# Patient Record
Sex: FEMALE | Race: OTHER | NOT HISPANIC OR LATINO
[De-identification: names, ages, dates, MRNs, and addresses within clinical notes are randomized per-mention and may not be internally consistent; named-entity substitution may affect disease eponyms.]

---

## 2017-03-07 ENCOUNTER — TRANSCRIPTION ENCOUNTER (OUTPATIENT)
Age: 52
End: 2017-03-07

## 2017-03-07 ENCOUNTER — INPATIENT (INPATIENT)
Facility: HOSPITAL | Age: 52
LOS: 0 days | Discharge: HOME | End: 2017-03-08
Attending: INTERNAL MEDICINE | Admitting: INTERNAL MEDICINE
Payer: COMMERCIAL

## 2017-03-07 VITALS
OXYGEN SATURATION: 100 % | HEART RATE: 59 BPM | TEMPERATURE: 98 F | RESPIRATION RATE: 18 BRPM | DIASTOLIC BLOOD PRESSURE: 101 MMHG | SYSTOLIC BLOOD PRESSURE: 160 MMHG

## 2017-03-07 DIAGNOSIS — I25.10 ATHEROSCLEROTIC HEART DISEASE OF NATIVE CORONARY ARTERY WITHOUT ANGINA PECTORIS: Chronic | ICD-10-CM

## 2017-03-07 DIAGNOSIS — Z95.5 PRESENCE OF CORONARY ANGIOPLASTY IMPLANT AND GRAFT: Chronic | ICD-10-CM

## 2017-03-07 DIAGNOSIS — R07.89 OTHER CHEST PAIN: ICD-10-CM

## 2017-03-07 DIAGNOSIS — Z41.8 ENCOUNTER FOR OTHER PROCEDURES FOR PURPOSES OTHER THAN REMEDYING HEALTH STATE: ICD-10-CM

## 2017-03-07 DIAGNOSIS — I10 ESSENTIAL (PRIMARY) HYPERTENSION: ICD-10-CM

## 2017-03-07 DIAGNOSIS — E78.5 HYPERLIPIDEMIA, UNSPECIFIED: ICD-10-CM

## 2017-03-07 DIAGNOSIS — I25.10 ATHEROSCLEROTIC HEART DISEASE OF NATIVE CORONARY ARTERY WITHOUT ANGINA PECTORIS: ICD-10-CM

## 2017-03-07 LAB
ALBUMIN SERPL ELPH-MCNC: 4.1 G/DL — SIGNIFICANT CHANGE UP (ref 3.3–5)
ALP SERPL-CCNC: 49 U/L — SIGNIFICANT CHANGE UP (ref 40–120)
ALT FLD-CCNC: 14 U/L — SIGNIFICANT CHANGE UP (ref 4–33)
APPEARANCE UR: CLEAR — SIGNIFICANT CHANGE UP
AST SERPL-CCNC: 18 U/L — SIGNIFICANT CHANGE UP (ref 4–32)
BACTERIA # UR AUTO: SIGNIFICANT CHANGE UP
BASOPHILS # BLD AUTO: 0.05 K/UL — SIGNIFICANT CHANGE UP (ref 0–0.2)
BASOPHILS NFR BLD AUTO: 0.5 % — SIGNIFICANT CHANGE UP (ref 0–2)
BILIRUB SERPL-MCNC: 0.4 MG/DL — SIGNIFICANT CHANGE UP (ref 0.2–1.2)
BILIRUB UR-MCNC: NEGATIVE — SIGNIFICANT CHANGE UP
BLOOD UR QL VISUAL: NEGATIVE — SIGNIFICANT CHANGE UP
BUN SERPL-MCNC: 21 MG/DL — SIGNIFICANT CHANGE UP (ref 7–23)
CALCIUM SERPL-MCNC: 9.6 MG/DL — SIGNIFICANT CHANGE UP (ref 8.4–10.5)
CHLORIDE SERPL-SCNC: 108 MMOL/L — HIGH (ref 98–107)
CHOLEST SERPL-MCNC: 168 MG/DL — SIGNIFICANT CHANGE UP (ref 120–199)
CK MB BLD-MCNC: 2.23 NG/ML — SIGNIFICANT CHANGE UP (ref 1–4.7)
CK MB BLD-MCNC: 2.32 NG/ML — SIGNIFICANT CHANGE UP (ref 1–4.7)
CK MB BLD-MCNC: SIGNIFICANT CHANGE UP (ref 0–2.5)
CK MB BLD-MCNC: SIGNIFICANT CHANGE UP (ref 0–2.5)
CK SERPL-CCNC: 110 U/L — SIGNIFICANT CHANGE UP (ref 25–170)
CK SERPL-CCNC: 136 U/L — SIGNIFICANT CHANGE UP (ref 25–170)
CO2 SERPL-SCNC: 24 MMOL/L — SIGNIFICANT CHANGE UP (ref 22–31)
COLOR SPEC: COLORLESS — SIGNIFICANT CHANGE UP
CREAT SERPL-MCNC: 0.87 MG/DL — SIGNIFICANT CHANGE UP (ref 0.5–1.3)
EOSINOPHIL # BLD AUTO: 0.11 K/UL — SIGNIFICANT CHANGE UP (ref 0–0.5)
EOSINOPHIL NFR BLD AUTO: 1.2 % — SIGNIFICANT CHANGE UP (ref 0–6)
GLUCOSE SERPL-MCNC: 147 MG/DL — HIGH (ref 70–99)
GLUCOSE UR-MCNC: NEGATIVE — SIGNIFICANT CHANGE UP
HBA1C BLD-MCNC: 5.5 % — SIGNIFICANT CHANGE UP (ref 4–5.6)
HCG UR-SCNC: NEGATIVE — SIGNIFICANT CHANGE UP
HCT VFR BLD CALC: 36.6 % — SIGNIFICANT CHANGE UP (ref 34.5–45)
HDLC SERPL-MCNC: 57 MG/DL — SIGNIFICANT CHANGE UP (ref 45–65)
HGB BLD-MCNC: 11.8 G/DL — SIGNIFICANT CHANGE UP (ref 11.5–15.5)
IMM GRANULOCYTES NFR BLD AUTO: 0.1 % — SIGNIFICANT CHANGE UP (ref 0–1.5)
KETONES UR-MCNC: NEGATIVE — SIGNIFICANT CHANGE UP
LEUKOCYTE ESTERASE UR-ACNC: NEGATIVE — SIGNIFICANT CHANGE UP
LIPID PNL WITH DIRECT LDL SERPL: 112 MG/DL — SIGNIFICANT CHANGE UP
LYMPHOCYTES # BLD AUTO: 5.51 K/UL — HIGH (ref 1–3.3)
LYMPHOCYTES # BLD AUTO: 59.6 % — HIGH (ref 13–44)
MCHC RBC-ENTMCNC: 30.3 PG — SIGNIFICANT CHANGE UP (ref 27–34)
MCHC RBC-ENTMCNC: 32.2 % — SIGNIFICANT CHANGE UP (ref 32–36)
MCV RBC AUTO: 93.8 FL — SIGNIFICANT CHANGE UP (ref 80–100)
MONOCYTES # BLD AUTO: 0.59 K/UL — SIGNIFICANT CHANGE UP (ref 0–0.9)
MONOCYTES NFR BLD AUTO: 6.4 % — SIGNIFICANT CHANGE UP (ref 2–14)
MUCOUS THREADS # UR AUTO: SIGNIFICANT CHANGE UP
NEUTROPHILS # BLD AUTO: 2.98 K/UL — SIGNIFICANT CHANGE UP (ref 1.8–7.4)
NEUTROPHILS NFR BLD AUTO: 32.2 % — LOW (ref 43–77)
NITRITE UR-MCNC: NEGATIVE — SIGNIFICANT CHANGE UP
PH UR: 7 — SIGNIFICANT CHANGE UP (ref 4.6–8)
PLATELET # BLD AUTO: 311 K/UL — SIGNIFICANT CHANGE UP (ref 150–400)
PMV BLD: 11.8 FL — SIGNIFICANT CHANGE UP (ref 7–13)
POTASSIUM SERPL-MCNC: 3.6 MMOL/L — SIGNIFICANT CHANGE UP (ref 3.5–5.3)
POTASSIUM SERPL-SCNC: 3.6 MMOL/L — SIGNIFICANT CHANGE UP (ref 3.5–5.3)
PROT SERPL-MCNC: 7.3 G/DL — SIGNIFICANT CHANGE UP (ref 6–8.3)
PROT UR-MCNC: NEGATIVE — SIGNIFICANT CHANGE UP
RBC # BLD: 3.9 M/UL — SIGNIFICANT CHANGE UP (ref 3.8–5.2)
RBC # FLD: 13.5 % — SIGNIFICANT CHANGE UP (ref 10.3–14.5)
RBC CASTS # UR COMP ASSIST: SIGNIFICANT CHANGE UP (ref 0–?)
SODIUM SERPL-SCNC: 144 MMOL/L — SIGNIFICANT CHANGE UP (ref 135–145)
SP GR SPEC: 1.02 — SIGNIFICANT CHANGE UP (ref 1–1.03)
SP GR UR: 1.02 — SIGNIFICANT CHANGE UP (ref 1–1.03)
SQUAMOUS # UR AUTO: SIGNIFICANT CHANGE UP
TRIGL SERPL-MCNC: 35 MG/DL — SIGNIFICANT CHANGE UP (ref 10–149)
TROPONIN T SERPL-MCNC: < 0.06 NG/ML — SIGNIFICANT CHANGE UP (ref 0–0.06)
TROPONIN T SERPL-MCNC: < 0.06 NG/ML — SIGNIFICANT CHANGE UP (ref 0–0.06)
TSH SERPL-MCNC: 1.54 UIU/ML — SIGNIFICANT CHANGE UP (ref 0.27–4.2)
UROBILINOGEN FLD QL: NORMAL E.U. — SIGNIFICANT CHANGE UP (ref 0.1–0.2)
WBC # BLD: 9.25 K/UL — SIGNIFICANT CHANGE UP (ref 3.8–10.5)
WBC # FLD AUTO: 9.25 K/UL — SIGNIFICANT CHANGE UP (ref 3.8–10.5)
WBC UR QL: SIGNIFICANT CHANGE UP (ref 0–?)

## 2017-03-07 PROCEDURE — 93458 L HRT ARTERY/VENTRICLE ANGIO: CPT | Mod: 26

## 2017-03-07 PROCEDURE — 71020: CPT | Mod: 26

## 2017-03-07 RX ORDER — ONDANSETRON 8 MG/1
4 TABLET, FILM COATED ORAL ONCE
Qty: 0 | Refills: 0 | Status: COMPLETED | OUTPATIENT
Start: 2017-03-07 | End: 2017-03-07

## 2017-03-07 RX ORDER — ASPIRIN/CALCIUM CARB/MAGNESIUM 324 MG
81 TABLET ORAL DAILY
Qty: 0 | Refills: 0 | Status: DISCONTINUED | OUTPATIENT
Start: 2017-03-08 | End: 2017-03-08

## 2017-03-07 RX ORDER — CLOPIDOGREL BISULFATE 75 MG/1
75 TABLET, FILM COATED ORAL DAILY
Qty: 0 | Refills: 0 | Status: DISCONTINUED | OUTPATIENT
Start: 2017-03-08 | End: 2017-03-08

## 2017-03-07 RX ORDER — ASPIRIN/CALCIUM CARB/MAGNESIUM 324 MG
324 TABLET ORAL ONCE
Qty: 0 | Refills: 0 | Status: COMPLETED | OUTPATIENT
Start: 2017-03-07 | End: 2017-03-07

## 2017-03-07 RX ORDER — INFLUENZA VIRUS VACCINE 15; 15; 15; 15 UG/.5ML; UG/.5ML; UG/.5ML; UG/.5ML
0.5 SUSPENSION INTRAMUSCULAR ONCE
Qty: 0 | Refills: 0 | Status: COMPLETED | OUTPATIENT
Start: 2017-03-07 | End: 2017-03-07

## 2017-03-07 RX ORDER — FENOFIBRATE,MICRONIZED 130 MG
145 CAPSULE ORAL DAILY
Qty: 0 | Refills: 0 | Status: DISCONTINUED | OUTPATIENT
Start: 2017-03-07 | End: 2017-03-08

## 2017-03-07 RX ORDER — CLOPIDOGREL BISULFATE 75 MG/1
300 TABLET, FILM COATED ORAL ONCE
Qty: 0 | Refills: 0 | Status: COMPLETED | OUTPATIENT
Start: 2017-03-07 | End: 2017-03-07

## 2017-03-07 RX ORDER — SODIUM CHLORIDE 9 MG/ML
1000 INJECTION INTRAMUSCULAR; INTRAVENOUS; SUBCUTANEOUS ONCE
Qty: 0 | Refills: 0 | Status: COMPLETED | OUTPATIENT
Start: 2017-03-07 | End: 2017-03-07

## 2017-03-07 RX ORDER — ATORVASTATIN CALCIUM 80 MG/1
40 TABLET, FILM COATED ORAL AT BEDTIME
Qty: 0 | Refills: 0 | Status: DISCONTINUED | OUTPATIENT
Start: 2017-03-07 | End: 2017-03-08

## 2017-03-07 RX ORDER — CARVEDILOL PHOSPHATE 80 MG/1
3.12 CAPSULE, EXTENDED RELEASE ORAL EVERY 12 HOURS
Qty: 0 | Refills: 0 | Status: DISCONTINUED | OUTPATIENT
Start: 2017-03-07 | End: 2017-03-08

## 2017-03-07 RX ADMIN — CARVEDILOL PHOSPHATE 3.12 MILLIGRAM(S): 80 CAPSULE, EXTENDED RELEASE ORAL at 20:22

## 2017-03-07 RX ADMIN — Medication 324 MILLIGRAM(S): at 02:18

## 2017-03-07 RX ADMIN — Medication 145 MILLIGRAM(S): at 12:39

## 2017-03-07 RX ADMIN — ONDANSETRON 4 MILLIGRAM(S): 8 TABLET, FILM COATED ORAL at 02:18

## 2017-03-07 RX ADMIN — ATORVASTATIN CALCIUM 40 MILLIGRAM(S): 80 TABLET, FILM COATED ORAL at 22:03

## 2017-03-07 RX ADMIN — CLOPIDOGREL BISULFATE 300 MILLIGRAM(S): 75 TABLET, FILM COATED ORAL at 09:50

## 2017-03-07 RX ADMIN — SODIUM CHLORIDE 1000 MILLILITER(S): 9 INJECTION INTRAMUSCULAR; INTRAVENOUS; SUBCUTANEOUS at 02:18

## 2017-03-07 NOTE — ED ADULT NURSE REASSESSMENT NOTE - NS ED NURSE REASSESS COMMENT FT1
return from break coverage, pt VS as noted, in NAD, denies chest pain at this time, states she feels lightheaded, MD Becker aware, awaiting lab results, will continue to monitor pt closely.

## 2017-03-07 NOTE — H&P ADULT. - ASSESSMENT
52 yo female with PMHx of MI 2013, Cardiac Arrest, cardiac stents x2, HTN, and HLD p/w new onset of fatigue, dizziness, palpitations and near syncope after carrying shopping bags  yesterday, followed by diaphoresis, dizziness, palpitations, and nausea which awoke her this morning, admitted to tele for atypical chest pain, r/o ACS.    +Atypical chest pain-->plavix load, Dasha, plan for cardiac cath

## 2017-03-07 NOTE — DISCHARGE NOTE ADULT - CARE PROVIDER_API CALL
Vega Sanchez), Cardiac Electrophysiology; Cardiovascular Disease; Internal Medicine  [id number] [place], [place] [address]  [place] [address]  Phone: [phone]  Fax: [phone]    Jose Alcantar (), Cardiology; Internal Medicine  [address] [place] [address] [address]  [place] [address]  Phone: [phone]  Fax: [phone]

## 2017-03-07 NOTE — ED PROVIDER NOTE - OBJECTIVE STATEMENT
att50 y/o f h/o CAD, cardiac arrest, MI stents, HLD, presents with dizziness/lightheadedness, palpitations, fatigue, weakness and vomiting. Progressively worse since yesterday am. Vomiting just in ED. No chest pain. No abd pain. Slight dry cough, no diarrhea. No other uri symptoms. Pt appears pale and fatigued to family.

## 2017-03-07 NOTE — H&P ADULT. - NEUROLOGICAL DETAILS
sensation intact/responds to pain/cranial nerves intact/responds to verbal commands/alert and oriented x 3

## 2017-03-07 NOTE — H&P ADULT. - RS GEN PE MLT RESP DETAILS PC
no rales/no chest wall tenderness/breath sounds equal/respirations non-labored/airway patent/clear to auscultation bilaterally/good air movement

## 2017-03-07 NOTE — DISCHARGE NOTE ADULT - CARE PROVIDERS DIRECT ADDRESSES
[email or web address],DirectAddress_Unknown

## 2017-03-07 NOTE — ED ADULT TRIAGE NOTE - CHIEF COMPLAINT QUOTE
pt was shopping yesterday at 11am and carrying heavy bags, began feeling dizziness, lethargy, and palpitations. also c.o nausea, actively vomiting in triage. appears pale. denies pain. pmh MI, stents x2, HLD

## 2017-03-07 NOTE — ED PROVIDER NOTE - MEDICAL DECISION MAKING DETAILS
att52 y/o f h/o CAD, cardiac arrest, MI stents, HLD, presents with dizziness/lightheadedness, palpitations, fatigue, weakness and vomiting. Progressively worse since yesterday am. Vomiting just in ED. No chest pain. No abd pain. Slight dry cough, no diarrhea. No other uri symptoms. Pt appears pale and fatigued to family. Exam as above. No TTP. Normal neuro exam. Plan for labs, IVF, r/u ACS, r/u infection, zofran, lipase, and reassessment. Plan to admit with significant history and appearance to r/u acs if negative workup. Not consistent with influenza, no HA, no fever. att52 y/o f h/o CAD, cardiac arrest, MI stents, HLD, presents with dizziness/lightheadedness, palpitations, fatigue, weakness and vomiting. Progressively worse since yesterday am. Vomiting just in ED. No chest pain. No abd pain. Slight dry cough, no diarrhea. No other uri symptoms. Pt appears pale and fatigued to family. Exam as above. No TTP. Normal neuro exam. Plan for labs, IVF, r/u ACS, r/u infection, zofran, lipase, and reassessment. Plan to admit with significant history and appearance to r/u acs if negative workup. Pt and family concerned with symptoms and similar CAD.  Not consistent with influenza, no HA, no fever. att52 y/o f h/o CAD, cardiac arrest, MI stents, HLD, presents with dizziness/lightheadedness, palpitations, fatigue, weakness and vomiting. Progressively worse since yesterday am. Vomiting just in ED. No chest pain. No abd pain. Slight dry cough, no diarrhea. No other uri symptoms. Pt appears pale and fatigued to family. Exam as above. No TTP. Normal neuro exam. Plan for labs, IVF, r/u ACS, r/u infection, zofran, lipase, and reassessment. changes on EKG. Plan to admit with significant history and appearance to r/u acs if negative infectious workup. Pt and family concerned with symptoms and previous CAD.  Not consistent with influenza, no HA, no fever. Some improvement in ED.

## 2017-03-07 NOTE — DISCHARGE NOTE ADULT - MEDICATION SUMMARY - MEDICATIONS TO CHANGE
I will SWITCH the dose or number of times a day I take the medications listed below when I get home from the hospital:    carvedilol 3.125 mg oral tablet  -- 1 tab(s) by mouth 2 times a day

## 2017-03-07 NOTE — DISCHARGE NOTE ADULT - PLAN OF CARE
To prevent further episodes, continue with your medications No heavy lifting greater than 5-10 pounds x one week.  No strenuous activity x 3 weeks.  Monitor site of procedure and notify your doctor for any redness/swelling/discharge. Follow up with your cardiologist in 1 week. Please call and make an appointment. To prevent worsening of palpitations, contiue with your meds as prescribed. Follow up with the EP clinic to set up a MCOT (Mobile Cardiac outpatient telemetry). Please call [phone] to set it up. continue with your medications Continue with your meds Keep cholesterol levels under control Continue with your meds and monitor lipid levels regularly control your bP Monitor BP, low sodium diet, continue with your meds.

## 2017-03-07 NOTE — H&P ADULT. - ATTENDING COMMENTS
Assessment  1. Chest pain  2. CAD  3. H/o cardiac arrest  4. HTN      Plan  1. Cardiac catheterization  2. ASA, statins, and beta blockers

## 2017-03-07 NOTE — DISCHARGE NOTE ADULT - PATIENT PORTAL LINK FT
“You can access the Follow[employer] Patient Portal, offered by [facility], by registering with the following website: [email or web address]”

## 2017-03-07 NOTE — H&P ADULT. - PROBLEM SELECTOR PLAN 1
tele monitor   cardiac enzymes x 2  Serial EKGs  DASH 1800 ADA diet with 1500 cc Fluid restriction  continue ASA, coreg, lipitor, fenofibrate.   Plavix loaded 300mg po x1, followed by 75mg po daily  NPO except meds for Cardiac Cath today  FLP, Hg1c, TSH

## 2017-03-07 NOTE — ED PROVIDER NOTE - NEUROLOGICAL, MLM
Alert and oriented, no focal deficits, no motor or sensory deficits. cn 2-12 intact bl. ms 5/5 bl ue and le. sensation intact bl. neg cheli hallpike. finger to nose normal bl.

## 2017-03-07 NOTE — DISCHARGE NOTE ADULT - CARE PLAN
Principal Discharge DX:	Atypical chest pain  Goal:	To prevent further episodes, continue with your medications  Instructions for follow-up, activity and diet:	No heavy lifting greater than 5-10 pounds x one week.  No strenuous activity x 3 weeks.  Monitor site of procedure and notify your doctor for any redness/swelling/discharge. Follow up with your cardiologist in 1 week. Please call and make an appointment.  Secondary Diagnosis:	Palpitations  Goal:	To prevent worsening of palpitations, contiue with your meds as prescribed.  Instructions for follow-up, activity and diet:	Follow up with the EP clinic to set up a MCOT (Mobile Cardiac outpatient telemetry). Please call [phone] to set it up. continue with your medications  Secondary Diagnosis:	CAD (coronary artery disease)  Instructions for follow-up, activity and diet:	Continue with your meds  Secondary Diagnosis:	HTN (hypertension)  Goal:	control your bP  Instructions for follow-up, activity and diet:	Monitor BP, low sodium diet, continue with your meds.  Secondary Diagnosis:	HLD (hyperlipidemia)  Goal:	Keep cholesterol levels under control  Instructions for follow-up, activity and diet:	Continue with your meds and monitor lipid levels regularly Principal Discharge DX:	Atypical chest pain  Goal:	To prevent further episodes, continue with your medications  Instructions for follow-up, activity and diet:	No heavy lifting greater than 5-10 pounds x one week.  No strenuous activity x 3 weeks.  Monitor site of procedure and notify your doctor for any redness/swelling/discharge. Follow up with your cardiologist in 1 week. Please call and make an appointment.  Secondary Diagnosis:	Palpitations  Goal:	To prevent worsening of palpitations, contiue with your meds as prescribed.  Instructions for follow-up, activity and diet:	Follow up with the EP clinic to set up a MCOT (Mobile Cardiac outpatient telemetry). Please call [phone] to set it up. continue with your medications  Secondary Diagnosis:	CAD (coronary artery disease)  Instructions for follow-up, activity and diet:	Continue with your meds  Secondary Diagnosis:	HTN (hypertension)  Goal:	control your bP  Instructions for follow-up, activity and diet:	Monitor BP, low sodium diet, continue with your meds.  Secondary Diagnosis:	HLD (hyperlipidemia)  Goal:	Keep cholesterol levels under control  Instructions for follow-up, activity and diet:	Continue with your meds and monitor lipid levels regularly Principal Discharge DX:	Atypical chest pain  Goal:	To prevent further episodes, continue with your medications  Instructions for follow-up, activity and diet:	No heavy lifting greater than 5-10 pounds x one week.  No strenuous activity x 3 weeks.  Monitor site of procedure and notify your doctor for any redness/swelling/discharge. Follow up with your cardiologist in 1 week. Please call and make an appointment.  Secondary Diagnosis:	Palpitations  Goal:	To prevent worsening of palpitations, contiue with your meds as prescribed.  Instructions for follow-up, activity and diet:	Follow up with the EP clinic to set up a MCOT (Mobile Cardiac outpatient telemetry). Please call [phone] to set it up. continue with your medications  Secondary Diagnosis:	CAD (coronary artery disease)  Instructions for follow-up, activity and diet:	Continue with your meds  Secondary Diagnosis:	HTN (hypertension)  Goal:	control your bP  Instructions for follow-up, activity and diet:	Monitor BP, low sodium diet, continue with your meds.  Secondary Diagnosis:	HLD (hyperlipidemia)  Goal:	Keep cholesterol levels under control  Instructions for follow-up, activity and diet:	Continue with your meds and monitor lipid levels regularly Principal Discharge DX:	Atypical chest pain  Goal:	To prevent further episodes, continue with your medications  Instructions for follow-up, activity and diet:	No heavy lifting greater than 5-10 pounds x one week.  No strenuous activity x 3 weeks.  Monitor site of procedure and notify your doctor for any redness/swelling/discharge. Follow up with your cardiologist in 1 week. Please call and make an appointment.  Secondary Diagnosis:	Palpitations  Goal:	To prevent worsening of palpitations, contiue with your meds as prescribed.  Instructions for follow-up, activity and diet:	Follow up with the EP clinic to set up a MCOT (Mobile Cardiac outpatient telemetry). Please call [phone] to set it up. continue with your medications  Secondary Diagnosis:	CAD (coronary artery disease)  Instructions for follow-up, activity and diet:	Continue with your meds  Secondary Diagnosis:	HTN (hypertension)  Goal:	control your bP  Instructions for follow-up, activity and diet:	Monitor BP, low sodium diet, continue with your meds.  Secondary Diagnosis:	HLD (hyperlipidemia)  Goal:	Keep cholesterol levels under control  Instructions for follow-up, activity and diet:	Continue with your meds and monitor lipid levels regularly

## 2017-03-07 NOTE — ED PROVIDER NOTE - CARE PLAN
Principal Discharge DX:	Lightheadedness Principal Discharge DX:	Lightheadedness  Instructions for follow-up, activity and diet:	r/u acs, r/u infection. Principal Discharge DX:	Lightheadedness  Instructions for follow-up, activity and diet:	r/u acs, r/u infection.  Secondary Diagnosis:	Abnormal EKG

## 2017-03-07 NOTE — DISCHARGE NOTE ADULT - MEDICATION SUMMARY - MEDICATIONS TO TAKE
I will START or STAY ON the medications listed below when I get home from the hospital:    Ecotrin Adult Low Strength 81 mg oral delayed release tablet  -- 1 tab(s) by mouth once a day  -- Indication: For CAD (coronary artery disease)    Lipitor 40 mg oral tablet  -- 1 tab(s) by mouth once a day  -- Indication: For HLD (hyperlipidemia)    fenofibrate 145 mg oral tablet  -- 1 tab(s) by mouth once a day  -- Indication: For HLD (hyperlipidemia)    Plavix 75 mg oral tablet  -- 1 tab(s) by mouth once a day  -- Indication: For CAD (coronary artery disease)    carvedilol 6.25 mg oral tablet  -- 1 tab(s) by mouth 2 times a day  -- Indication: For HTN/Palpitations

## 2017-03-07 NOTE — ED PROVIDER NOTE - NEURO NEGATIVE STATEMENT, MLM
no loss of consciousness, no gait abnormality, no headache, no sensory deficits, and generalized weakness.

## 2017-03-07 NOTE — DISCHARGE NOTE ADULT - HOSPITAL COURSE
50 yo female with PMHx of MI 2013, Cardiac Arrest, cardiac stents x2, HTN, and HLD p/w new onset of fatigue, dizziness, palpitations yesterday. Pt was coming from the store with shopping bags, when suddenly felt fatigue, dizziness, palpitations, and felt like passing out. She sat in the car and rest for about 10 mins. Most of the symptoms resolved except fatigue. At 1230am, dizziness and cold sweat woke her up from sleep, accompanied by nausea, fatigue and palpitations. Palpitations described as heart beating very fast. Pt denies fever, chills, SOB, or abdominal pain.    In E.D. pt had an episode of nausea and vomiting x1 and was given ASA 324mg and IV NS x 1L bolus where her symptoms improved.  Underwent a Cardica catheterization via 50 yo female with PMHx of MI 2013, Cardiac Arrest, cardiac stents x2, HTN, and HLD p/w new onset of fatigue, dizziness, palpitations yesterday. Pt was coming from the store with shopping bags, when suddenly felt fatigue, dizziness, palpitations, and felt like passing out. She sat in the car and rest for about 10 mins. Most of the symptoms resolved except fatigue. At 1230am, dizziness and cold sweat woke her up from sleep, accompanied by nausea, fatigue and palpitations. Palpitations described as heart beating very fast. Pt denies fever, chills, SOB, or abdominal pain.    In E.D. pt had an episode of nausea and vomiting x1 and was given ASA 324mg and IV NS x 1L bolus where her symptoms improved.    Hospital Course:   Cardiac cath showed  prior LAD/LCx stents patent; RRB to be removed at 2025. RRB site stable.   Echo showed:  Normal mitral valve. Minimal mitral regurgitation. 2. Normal left ventricular internal dimensions and wall thicknesses. 3. Endocardium not well visualized; grossly moderate segmental left ventricular systolic dysfunction. Hypokinesis of the apex, distal anterior wall,  and distal septum, Endocardial visualization enhanced with intravenous injection of echo contrast (Definity).  No LV thrombus seen. 4. The right ventricle is not well visualized; grossly normal right ventricular systolic function.  EP was consulted for palpitations. Pt has been  40% after mi in 2013,Most likely palpitations likely secondary to dehydration, although svt can not be ruled out. Suggested EP study, but patient refused and agreed to MCOT (mobile cardiac outpatient telemetry). Patient to call EP clinic to set up the MCOT.  Also recommended to increased coreg to 6.25. Discussed with Dr. Alcantar and stated patient is stable for discharge home now. 50 yo female with PMHx of MI 2013, Cardiac Arrest, cardiac stents x2, HTN, and HLD p/w new onset of fatigue, dizziness, palpitations yesterday. Pt was coming from the store with shopping bags, when suddenly felt fatigue, dizziness, palpitations, and felt like passing out. She sat in the car and rest for about 10 mins. Most of the symptoms resolved except fatigue. At 1230am, dizziness and cold sweat woke her up from sleep, accompanied by nausea, fatigue and palpitations. Palpitations described as heart beating very fast. Pt denies fever, chills, SOB, or abdominal pain.    In E.D. pt had an episode of nausea and vomiting x1 and was given ASA 324mg and IV NS x 1L bolus where her symptoms improved.    Hospital Course:   Cardiac cath showed  prior LAD/LCx stents patent; RRB to be removed at 2025. RRB site stable.   Echo showed:  Normal mitral valve. Minimal mitral regurgitation. 2. Normal left ventricular internal dimensions and wall thicknesses. 3. Endocardium not well visualized; grossly moderate segmental left ventricular systolic dysfunction. Hypokinesis of the apex, distal anterior wall,  and distal septum, Endocardial visualization enhanced with intravenous injection of echo contrast (Definity).  No LV thrombus seen. 4. The right ventricle is not well visualized; grossly normal right ventricular systolic function.  EP was consulted for palpitations. Pt has been  40% after mi in 2013,Most likely palpitations likely secondary to dehydration, although svt can not be ruled out. Suggested EP study, but patient refused and agreed to MCOT (mobile cardiac outpatient telemetry). Patient to call EP clinic to set up the MCOT.  Also recommended to increased coreg to 6.25. Patient was to sent home on lisinopril but patient stated she has tried lisinopril and  losartan in the past and both have given her a cough. So will not continue ACE/ARB secondary to that. Will continue plavix which was started during the hospitalization and patient can follow up with her cardiologist. Discussed with Dr. Alcantar and stated patient is stable for discharge home now.   Ini

## 2017-03-07 NOTE — H&P ADULT. - NEGATIVE CARDIOVASCULAR SYMPTOMS
no peripheral edema/no dyspnea on exertion/no paroxysmal nocturnal dyspnea/no chest pain/no claudication/no orthopnea

## 2017-03-07 NOTE — H&P ADULT. - PMH
CAD (coronary artery disease)    Cardiac arrest  2013 MI stents and cardiac arrested  HLD (hyperlipidemia)    HTN (hypertension)

## 2017-03-07 NOTE — ED ADULT NURSE REASSESSMENT NOTE - NS ED NURSE REASSESS COMMENT FT1
VS as noted, pt in NAD, resting comfortably, awaiting bed assignment, will continue to monitor patient.

## 2017-03-07 NOTE — ED PROVIDER NOTE - CONSTITUTIONAL, MLM
normal... appears fatigued, pale, well nourished, awake, alert, oriented to person, place, time/situation and in no apparent distress.

## 2017-03-07 NOTE — ED ADULT NURSE NOTE - OBJECTIVE STATEMENT
Break coverage RN received pt to spot 4 A&Ox4 c/o midsternal chest pain nonradiating associated with N/V and diaphoresis worsening x 1 day. Pt appears pallor on assessment, noted to be diaphoretic, actively vomiting on assessment. Daughter reports pt has hx of cardiac arrest, hx of multiple cardiac stents and HTN. IV placed, labs sent, VS as noted, MD at bedside, meds administered as ordered, will reassess.

## 2017-03-07 NOTE — H&P ADULT. - HISTORY OF PRESENT ILLNESS
50 yo female with PMHx of MI 2013, Cardiac Arrest, cardiac stents x2, HTN, and HLD p/w new onset of fatigue, dizziness, palpitations yesterday. Pt was coming from the store with shopping bags, when suddenly felt fatigue, dizziness, palpitations, and felt like passing out. She sat in the car and rest for about 10 mins. Most of the symptoms resolved except fatigue. At 1230am, dizziness and cold sweat woke her up from sleep, accompanied by nausea, fatigue and palpitations. Palpitations described as heart beating very fast. Pt denies fever, chills, SOB, or abdominal pain.    In E.D. pt had an episode of nausea and vomiting x1 and was given ASA 324mg and IV NS x 1L bolus where her symptoms improved.

## 2017-03-08 VITALS
RESPIRATION RATE: 18 BRPM | TEMPERATURE: 98 F | SYSTOLIC BLOOD PRESSURE: 123 MMHG | DIASTOLIC BLOOD PRESSURE: 81 MMHG | HEART RATE: 71 BPM | OXYGEN SATURATION: 98 %

## 2017-03-08 LAB
BACTERIA UR CULT: SIGNIFICANT CHANGE UP
BUN SERPL-MCNC: 19 MG/DL — SIGNIFICANT CHANGE UP (ref 7–23)
CALCIUM SERPL-MCNC: 9.2 MG/DL — SIGNIFICANT CHANGE UP (ref 8.4–10.5)
CHLORIDE SERPL-SCNC: 108 MMOL/L — HIGH (ref 98–107)
CO2 SERPL-SCNC: 21 MMOL/L — LOW (ref 22–31)
CREAT SERPL-MCNC: 0.87 MG/DL — SIGNIFICANT CHANGE UP (ref 0.5–1.3)
GLUCOSE SERPL-MCNC: 85 MG/DL — SIGNIFICANT CHANGE UP (ref 70–99)
HCT VFR BLD CALC: 35.2 % — SIGNIFICANT CHANGE UP (ref 34.5–45)
HGB BLD-MCNC: 11.4 G/DL — LOW (ref 11.5–15.5)
MCHC RBC-ENTMCNC: 30.3 PG — SIGNIFICANT CHANGE UP (ref 27–34)
MCHC RBC-ENTMCNC: 32.4 % — SIGNIFICANT CHANGE UP (ref 32–36)
MCV RBC AUTO: 93.6 FL — SIGNIFICANT CHANGE UP (ref 80–100)
PLATELET # BLD AUTO: 289 K/UL — SIGNIFICANT CHANGE UP (ref 150–400)
PMV BLD: 11.9 FL — SIGNIFICANT CHANGE UP (ref 7–13)
POTASSIUM SERPL-MCNC: 3.7 MMOL/L — SIGNIFICANT CHANGE UP (ref 3.5–5.3)
POTASSIUM SERPL-SCNC: 3.7 MMOL/L — SIGNIFICANT CHANGE UP (ref 3.5–5.3)
RBC # BLD: 3.76 M/UL — LOW (ref 3.8–5.2)
RBC # FLD: 13.6 % — SIGNIFICANT CHANGE UP (ref 10.3–14.5)
SODIUM SERPL-SCNC: 144 MMOL/L — SIGNIFICANT CHANGE UP (ref 135–145)
SPECIMEN SOURCE: SIGNIFICANT CHANGE UP
WBC # BLD: 6.96 K/UL — SIGNIFICANT CHANGE UP (ref 3.8–10.5)
WBC # FLD AUTO: 6.96 K/UL — SIGNIFICANT CHANGE UP (ref 3.8–10.5)

## 2017-03-08 PROCEDURE — 99222 1ST HOSP IP/OBS MODERATE 55: CPT | Mod: GC

## 2017-03-08 PROCEDURE — 93306 TTE W/DOPPLER COMPLETE: CPT | Mod: 26

## 2017-03-08 RX ORDER — CARVEDILOL PHOSPHATE 80 MG/1
1 CAPSULE, EXTENDED RELEASE ORAL
Qty: 0 | Refills: 0 | COMMUNITY

## 2017-03-08 RX ORDER — CARVEDILOL PHOSPHATE 80 MG/1
1 CAPSULE, EXTENDED RELEASE ORAL
Qty: 60 | Refills: 0
Start: 2017-03-08 | End: 2017-04-07

## 2017-03-08 RX ORDER — CLOPIDOGREL BISULFATE 75 MG/1
1 TABLET, FILM COATED ORAL
Qty: 30 | Refills: 0
Start: 2017-03-08 | End: 2017-04-07

## 2017-03-08 RX ORDER — LISINOPRIL 2.5 MG/1
2.5 TABLET ORAL DAILY
Qty: 0 | Refills: 0 | Status: DISCONTINUED | OUTPATIENT
Start: 2017-03-08 | End: 2017-03-08

## 2017-03-08 RX ORDER — ACETAMINOPHEN 500 MG
650 TABLET ORAL EVERY 6 HOURS
Qty: 0 | Refills: 0 | Status: DISCONTINUED | OUTPATIENT
Start: 2017-03-08 | End: 2017-03-08

## 2017-03-08 RX ADMIN — Medication 145 MILLIGRAM(S): at 12:01

## 2017-03-08 RX ADMIN — CARVEDILOL PHOSPHATE 3.12 MILLIGRAM(S): 80 CAPSULE, EXTENDED RELEASE ORAL at 05:18

## 2017-03-08 RX ADMIN — Medication 650 MILLIGRAM(S): at 06:45

## 2017-03-08 RX ADMIN — CLOPIDOGREL BISULFATE 75 MILLIGRAM(S): 75 TABLET, FILM COATED ORAL at 12:01

## 2017-03-08 RX ADMIN — Medication 650 MILLIGRAM(S): at 06:00

## 2017-03-08 RX ADMIN — CARVEDILOL PHOSPHATE 3.12 MILLIGRAM(S): 80 CAPSULE, EXTENDED RELEASE ORAL at 17:46

## 2017-03-08 RX ADMIN — Medication 81 MILLIGRAM(S): at 12:01

## 2018-10-17 NOTE — PATIENT PROFILE ADULT. - MEDICATIONS BROUGHT TO HOSPITAL, PROFILE
Spoke with patient and discussed message below. He is advised that his stress test is canceled for tomorrow and  recommends discussing stress test (NM/Exercise) with Dr. Lezama next Tuesday.     Pt agreed with plan.     Malorie COX RN     no

## 2021-05-14 ENCOUNTER — EMERGENCY (EMERGENCY)
Facility: HOSPITAL | Age: 56
LOS: 1 days | Discharge: HOME | End: 2021-05-14
Attending: EMERGENCY MEDICINE
Payer: MEDICARE

## 2021-05-14 VITALS
DIASTOLIC BLOOD PRESSURE: 80 MMHG | RESPIRATION RATE: 18 BRPM | TEMPERATURE: 98 F | SYSTOLIC BLOOD PRESSURE: 144 MMHG | HEART RATE: 68 BPM | OXYGEN SATURATION: 100 %

## 2021-05-14 VITALS
RESPIRATION RATE: 18 BRPM | TEMPERATURE: 98 F | WEIGHT: 167.99 LBS | OXYGEN SATURATION: 97 % | SYSTOLIC BLOOD PRESSURE: 148 MMHG | HEART RATE: 73 BPM | HEIGHT: 62 IN | DIASTOLIC BLOOD PRESSURE: 86 MMHG

## 2021-05-14 DIAGNOSIS — I25.10 ATHEROSCLEROTIC HEART DISEASE OF NATIVE CORONARY ARTERY WITHOUT ANGINA PECTORIS: Chronic | ICD-10-CM

## 2021-05-14 DIAGNOSIS — Z95.5 PRESENCE OF CORONARY ANGIOPLASTY IMPLANT AND GRAFT: Chronic | ICD-10-CM

## 2021-05-14 LAB — SARS-COV-2 RNA SPEC QL NAA+PROBE: DETECTED

## 2021-05-14 PROCEDURE — U0003: CPT

## 2021-05-14 PROCEDURE — 99284 EMERGENCY DEPT VISIT MOD MDM: CPT

## 2021-05-14 PROCEDURE — U0005: CPT

## 2021-05-14 PROCEDURE — 99284 EMERGENCY DEPT VISIT MOD MDM: CPT | Mod: 25

## 2021-05-14 PROCEDURE — 96374 THER/PROPH/DIAG INJ IV PUSH: CPT

## 2021-05-14 RX ORDER — METOCLOPRAMIDE HCL 10 MG
10 TABLET ORAL ONCE
Refills: 0 | Status: COMPLETED | OUTPATIENT
Start: 2021-05-14 | End: 2021-05-14

## 2021-05-14 RX ORDER — SODIUM CHLORIDE 9 MG/ML
1000 INJECTION INTRAMUSCULAR; INTRAVENOUS; SUBCUTANEOUS ONCE
Refills: 0 | Status: COMPLETED | OUTPATIENT
Start: 2021-05-14 | End: 2021-05-14

## 2021-05-14 RX ORDER — MECLIZINE HCL 12.5 MG
25 TABLET ORAL ONCE
Refills: 0 | Status: COMPLETED | OUTPATIENT
Start: 2021-05-14 | End: 2021-05-14

## 2021-05-14 RX ORDER — ACETAMINOPHEN 500 MG
975 TABLET ORAL ONCE
Refills: 0 | Status: COMPLETED | OUTPATIENT
Start: 2021-05-14 | End: 2021-05-14

## 2021-05-14 RX ADMIN — Medication 975 MILLIGRAM(S): at 14:27

## 2021-05-14 RX ADMIN — SODIUM CHLORIDE 1000 MILLILITER(S): 9 INJECTION INTRAMUSCULAR; INTRAVENOUS; SUBCUTANEOUS at 14:28

## 2021-05-14 RX ADMIN — Medication 10 MILLIGRAM(S): at 14:28

## 2021-05-14 RX ADMIN — Medication 25 MILLIGRAM(S): at 14:31

## 2021-05-14 NOTE — ED ADULT TRIAGE NOTE - CHIEF COMPLAINT QUOTE
pt c/o headaches 3 days with dizziness vomitted x 2 today son had covid recently and resides with patient

## 2021-05-14 NOTE — ED ADULT NURSE NOTE - NSIMPLEMENTINTERV_GEN_ALL_ED
Implemented All Universal Safety Interventions:  [place] to call system. Call bell, personal items and telephone within reach. Instruct patient to call for assistance. Room bathroom lighting operational. Non-slip footwear when patient is off stretcher. Physically safe environment: no spills, clutter or unnecessary equipment. Stretcher in lowest position, wheels locked, appropriate side rails in place.

## 2021-05-14 NOTE — ED PROVIDER NOTE - OBJECTIVE STATEMENT
The patient is a 56y Female complaining of headache. The patient is a 56y Female with pmhx of s/p stent placement after MI complaining of headache. Started 3 days ago abruptly while pt was sitting at home. Reached peak intensity within a few min, became 10/10 intensity. No migraine hx. Denies any fever or neck stiffness. Her son was diagnosed with covid 2 weeks ago, has been exposed to him. Denies any vision changes, head trauma, or other neuro symptoms. Pt has been taking aleve to relieve pain. Says the HA waxes and wanes in intensity. This AM, pt became nauseous and felt that the room was spinning around her. Subsequently had 2 episodes of non-bloody vomiting. Brought to ED by son. No allergies. The patient is a 56y Female with pmhx of s/p stent placement after MI complaining of headache. Started 3 days ago abruptly while pt was sitting at home. Reached peak intensity within a few min, became 10/10 intensity. No migraine hx. Denies any fever or neck stiffness. Her son was diagnosed with covid 2 weeks ago, has been exposed to him. Denies any vision changes, head trauma, or other neuro symptoms. Pt has been taking aleve to relieve pain. Says the HA waxes and wanes in intensity. This AM, pt became nauseous and felt that the room was spinning around her. Subsequently had 2 episodes of non-bloody vomiting. Brought to ED by son. No allergies. Denies any CP, SOB, abd pain, leg swelling. Hasn't gotten covid vaccines yet since he had shingles infection in Feb. 2021. The patient is a 56y Female with pmhx of s/p stent placement after MI complaining of headache. Started 3 days ago while pt was sitting at home. Reached peak intensity within a few min, became 10/10 intensity. No migraine hx. Denies any fever or neck stiffness. Her son was diagnosed with covid 2 weeks ago, has been exposed to him. Denies any vision changes, head trauma, or other neuro symptoms. Pt has been taking aleve to relieve pain. Says the HA waxes and wanes in intensity. This AM, pt became nauseous and felt that the room was spinning around her. Subsequently had 2 episodes of non-bloody vomiting. Brought to ED by son. No allergies. Denies any CP, SOB, abd pain, leg swelling. Hasn't gotten covid vaccines yet since she had shingles infection in Feb. 2021.

## 2021-05-14 NOTE — ED ADULT NURSE REASSESSMENT NOTE - NS ED NURSE REASSESS COMMENT FT1
pt sitting comfortably with VS WNL, reports improved HA pain down to 5/10 and awaiting discharge safety measures in place  and call bell is in reach

## 2021-05-14 NOTE — ED ADULT NURSE NOTE - CHPI ED NUR SYMPTOMS NEG
no blurred vision/no change in level of consciousness/no confusion/no fever/no numbness/no vomiting/no weakness

## 2021-05-14 NOTE — ED PROVIDER NOTE - PATIENT PORTAL LINK FT
You can access the FollowMyHealth Patient Portal offered by [facility] by registering at the following website: [email or web address]. By joining [employer]’s FollowMyHealth portal, you will also be able to view your health information using other applications (apps) compatible with our system.

## 2021-05-14 NOTE — ED PROVIDER NOTE - PHYSICAL EXAMINATION
Gen: NAD, A&Ox4. Non-toxic appearing  HEENT: Normocephalic and atraumatic. EOMI, no nasal discharge, mucous membranes dry, no scleral icterus.  CV: Regular rate and rhythm, +S1/S2, no M/R/G. No significant lower extremity edema. Radial and DP pulses present and symmetrical. Capillary refill less than 2 seconds.  Resp: Normal effort and rate. CTAB, no rales, rhonchi, or wheezes.  GI: Abdomen soft, non-distended, non tender to palpation. No masses appreciated. Bowel sounds present.  MSK: No open wounds and no bruising  Neuro: Following commands, speaking in full sentences, moving extremities spontaneously. CN II-XII intact. Strength and sensation symmetric and intact throughout. Reflexes 2+ throughout. Cerebellar testing normal. No temporal lobe tenderness bilaterally.  Psych: Appropriate mood, cooperative Gen: NAD, A&Ox4. Non-toxic appearing  HEENT: Normocephalic and atraumatic. EOMI, no nasal discharge, mucous membranes dry, no scleral icterus.  CV: Regular rate and rhythm, +S1/S2, no M/R/G. No significant lower extremity edema. Radial and DP pulses present and symmetrical. Capillary refill less than 2 seconds.  Resp: Normal effort and rate. CTAB, no rales, rhonchi, or wheezes.  GI: Abdomen soft, non-distended, non tender to palpation. No masses appreciated. Bowel sounds present.  MSK: No open wounds and no bruising  Neuro: Following commands, speaking in full sentences, moving extremities spontaneously. CN II-XII intact. Strength and sensation symmetric and intact throughout. Reflexes 2+ throughout. Cerebellar testing normal. No temporal lobe tenderness bilaterally. No nystagmus bilaterally.  Psych: Appropriate mood, cooperative

## 2021-05-14 NOTE — ED PROVIDER NOTE - CARE PLAN
Principal Discharge DX:	Headache   Principal Discharge DX:	Headache  Secondary Diagnosis:	Suspected 2019 novel coronavirus infection

## 2021-05-14 NOTE — ED PROVIDER NOTE - NS ED ROS FT
GENERAL: No fever or chills  EYES: No change in vision  HEENT: No trouble swallowing or speaking  CARDIAC: No chest pain  PULMONARY: No cough or SOB  GI: +nausea and vomiting; no abdominal pain, diarrhea, or constipation  : No changes in urination  SKIN: No rashes  NEURO: +headache and vertigo; no numbness  MSK: No joint pain  Otherwise as HPI or negative.

## 2021-05-14 NOTE — ED PROVIDER NOTE - CLINICAL SUMMARY MEDICAL DECISION MAKING FREE TEXT BOX
Valdemar Hermosillo MD (PGY-1): The patient is a 56y Female with pmhx of s/p stent placement after MI complaining of headache. Low suspicion for SAH, meningitis, temporal arteritis. Most likely migraine or headache due to covid infection. Pt also has symptoms of peripheral vertigo. Will give IVF, Tylenol, Reglan, and meclizine. Will get covid test. If pain and nausea are well controlled, pt will be discharged home with return precautions and PCP follow-up. Valdemar Hermosillo MD (PGY-1): The patient is a 56y Female with pmhx of s/p stent placement after MI complaining of headache. Low suspicion for SAH, meningitis, temporal arteritis. Most likely migraine or headache due to covid infection. Pt also has symptoms of peripheral vertigo. Will give IVF, Tylenol, Reglan, and meclizine. Will get covid test. If pain and nausea are well controlled, pt will be discharged home with return precautions and PCP follow-up.    Yesenia Lovett MD - Attending Physician: Pt here with headache, no neuro symptoms, no respiratory complaints. +Close exposure to COVID. Most c/w COVID, possible tension headache/migraine. Symptomatic control, f/u with pmd

## 2021-05-14 NOTE — ED ADULT NURSE NOTE - OBJECTIVE STATEMENT
57 yo F pt with PMHx of heart attack and stent placement 2013 and 2014 presents to ED c/o HA for past 4 days worsened today with dizziness and one episode of emesis. pt reports her son was diagnosed 2 weeks ago. pt admits dizziness is provoked on head movement/laying down and feels as though the room is spinning when she feels dizzy. pt has not been vaccinated d/t recent shingles outbreak. pt takes aspirin and losartan daily.   pt is A&Ox4, MAEW, skin is warm dry intact and color is normal for race, NO arm drift, NO leg drift, no facial droop, PERRL, sensation grossly intact, neck is supple with full ROM, gait is unaffected by dizziness.  pt denies: chest pain, SOB, abd pain, vision changes, LOC, syncope, fever, chills, myalgias, numbness/tingling, generalized or focal weakness at this time.

## 2021-05-14 NOTE — ED PROVIDER NOTE - NSFOLLOWUPINSTRUCTIONS_ED_ALL_ED_FT
Thank you for visiting our Emergency Department, it has been a pleasure taking part in your healthcare.    Please follow up with your Primary Doctor in 2-3 days.      You were seen in the Emergency Department for COVID-19 Infection    YOU MUST SELF-QUARANTINE. Avoid close contact anyone until you meet the below criteria.     You are eligible to return to work or usual activities if:            1. It has been at least 10 days since your symptoms started AND            2. No fevers (temperature over 100.4F) for at least 24 hours AND            3. Your cough and shortness of breath has improved     Return to the ED for trouble catching your breath when you are resting or inability to keep any liquids down    You may over the counter acetaminophen (Tylenol) 650mg every 4-6 hours as needed for fever or pain.   Do NOT exceed 3500mg acetaminophen in 24 hours.   You may take over the counter Ibuprofen 600mg every 6-8 hours as needed for fever or pain  Please do not take these medications if you do not have pain or fever or if you have any history of liver disease.     -------------    What is a coronavirus?  Coronaviruses are a large family of viruses that cause illnesses ranging from the common cold to more severe diseases such as Middle East Respiratory Syndrome (MERS) and Severe Acute Respiratory Syndrome (SARS).    What is Novel Coronavirus (COVID-19)?  The Centers for Disease Control and Prevention (CDC) is closely monitoring the outbreak caused by COVID-19. For the latest information about COVID-19, visit the CDC website at CDC.gov/Coronavirus    How are coronaviruses spread?  Coronaviruses can be transmitted from person to person, usually after close contact with an infected  person (for example, in a household, workplace, or healthcare setting), via droplets that become airborne after a cough or sneeze. These droplets can then infect a nearby person. Transmission can also occur by touching recently contaminated surfaces.    Is there a treatment for a COVID-19?  There is no specific treatment for disease caused by COVID-19. However, many of the symptoms can be treated based on the patient’s clinical condition. Supportive care for infected persons can be highly effective.    What are the symptoms of coronavirus infection?  It depends on the virus, but common signs include fever and/or respiratory symptoms such as cough and shortness of breath. In more severe cases, infection can cause pneumonia, severe acute respiratory syndrome, kidney failure and even death. Fortunately, most cases of COVID-19 have an illness no different than the influenza (flu), with a majority of these patients having mild symptoms and overall mortality which appears to be not much different than the flu.    What can I do to protect myself?  The best precautionary measures:  – washing your hands  – covering your cough  – disinfecting surfaces  – it is also advisable to avoid close contact with anyone showing symptoms of respiratory illness such as coughing and sneezing  – those with symptoms should wear a surgical mask when around others    What can I do to protect those around me?  If you have been identified as someone who may be infected with COVID-19, we recommend you follow the self-isolation procedures outlined on the following page to protect those around you and to limit the spread of this virus.    We recommend the below precautionary steps from now until 14 days from when you returned from your travel or date of your last known possible contact:    — Do not go to work, school or public areas. Avoid using public transportation, ridesharing or taxis.  — As much as possible, separate yourself from other people in your home. If you can, you should stay in a room and away from other people. Also, you should use a separate bathroom if available.  — Wear the supplied mask whenever you are around other people.  — If you have a non-urgent medical appointment, please reschedule for a later date. If the appointment is urgent, please call the health care provider and tell them that you are on self-isolation for possible COVID-19. This will help the health care provider’s office take steps to keep other people from getting infected or exposed. If you can reschedule routine appointments, do so.  — Wash your hands often with soap and water for at least 15 to 20 seconds or clean your hands with an alcohol-based hand  that contains 60 to 95% alcohol, covering all surfaces of your hands and rubbing them together until they feel dry. Soap and water should be used preferentially if hands are visibly dirty.  — Cover your mouth and nose with a tissue when you cough or sneeze. Throw used tissues in a lined trash can. Immediately wash your hands.  — Avoid touching your eyes, nose, and mouth with your hands.  — Avoid sharing personal household items. You should not share dishes, drinking glasses, cups, eating utensils, towels, or bedding with other people or pets in your home. After using these items, they should be washed thoroughly with soap and water.  — Clean and disinfect all “high-touch” surfaces every day. High touch surfaces include counters, tabletops, doorknobs, light switches, remote controls, bathroom fixtures, toilets, phones, keyboards, tablets, and bedside tables. Also, clean any surfaces that may have blood, stool, or body fluids on them.    ------------------------------------------    REMEMBER - a negative COVID test means you were negative AT THE TIME OF TESTING, and it is possible to have contracted COVID after being tested. Thank you for visiting our Emergency Department, it has been a pleasure taking part in your healthcare.    Please follow up with your Primary Doctor in 2-3 days.      You were seen in the Emergency Department for COVID-19 Infection    YOU MUST SELF-QUARANTINE. Avoid close contact anyone until you meet the below criteria.     You are eligible to return to work or usual activities if:            1. It has been at least 10 days since your symptoms started AND            2. No fevers (temperature over 100.4F) for at least 24 hours AND            3. Your cough and shortness of breath has improved     Return to the ED for trouble catching your breath when you are resting or inability to keep any liquids down    You may over the counter acetaminophen (Tylenol) 650mg every 4-6 hours as needed for fever or pain.   Do NOT exceed 3500mg acetaminophen in 24 hours.   You may take over the counter Ibuprofen 600mg every 6-8 hours as needed for fever or pain  Please do not take these medications if you do not have pain or fever or if you have any history of liver disease.     -------------    What is a coronavirus?  Coronaviruses are a large family of viruses that cause illnesses ranging from the common cold to more severe diseases such as Middle East Respiratory Syndrome (MERS) and Severe Acute Respiratory Syndrome (SARS).    What is Novel Coronavirus (COVID-19)?  The Centers for Disease Control and Prevention (CDC) is closely monitoring the outbreak caused by COVID-19. For the latest information about COVID-19, visit the CDC website at CDC.gov/Coronavirus    How are coronaviruses spread?  Coronaviruses can be transmitted from person to person, usually after close contact with an infected  person (for example, in a household, workplace, or healthcare setting), via droplets that become airborne after a cough or sneeze. These droplets can then infect a nearby person. Transmission can also occur by touching recently contaminated surfaces.    Is there a treatment for a COVID-19?  There is no specific treatment for disease caused by COVID-19. However, many of the symptoms can be treated based on the patient’s clinical condition. Supportive care for infected persons can be highly effective.    What are the symptoms of coronavirus infection?  It depends on the virus, but common signs include fever and/or respiratory symptoms such as cough and shortness of breath. In more severe cases, infection can cause pneumonia, severe acute respiratory syndrome, kidney failure and even death. Fortunately, most cases of COVID-19 have an illness no different than the influenza (flu), with a majority of these patients having mild symptoms and overall mortality which appears to be not much different than the flu.    What can I do to protect myself?  The best precautionary measures:  – washing your hands  – covering your cough  – disinfecting surfaces  – it is also advisable to avoid close contact with anyone showing symptoms of respiratory illness such as coughing and sneezing  – those with symptoms should wear a surgical mask when around others    What can I do to protect those around me?  If you have been identified as someone who may be infected with COVID-19, we recommend you follow the self-isolation procedures outlined on the following page to protect those around you and to limit the spread of this virus.    We recommend the below precautionary steps from now until 14 days from when you returned from your travel or date of your last known possible contact:    — Do not go to work, school or public areas. Avoid using public transportation, ridesharing or taxis.  — As much as possible, separate yourself from other people in your home. If you can, you should stay in a room and away from other people. Also, you should use a separate bathroom if available.  — Wear the supplied mask whenever you are around other people.  — If you have a non-urgent medical appointment, please reschedule for a later date. If the appointment is urgent, please call the health care provider and tell them that you are on self-isolation for possible COVID-19. This will help the health care provider’s office take steps to keep other people from getting infected or exposed. If you can reschedule routine appointments, do so.  — Wash your hands often with soap and water for at least 15 to 20 seconds or clean your hands with an alcohol-based hand  that contains 60 to 95% alcohol, covering all surfaces of your hands and rubbing them together until they feel dry. Soap and water should be used preferentially if hands are visibly dirty.  — Cover your mouth and nose with a tissue when you cough or sneeze. Throw used tissues in a lined trash can. Immediately wash your hands.  — Avoid touching your eyes, nose, and mouth with your hands.  — Avoid sharing personal household items. You should not share dishes, drinking glasses, cups, eating utensils, towels, or bedding with other people or pets in your home. After using these items, they should be washed thoroughly with soap and water.  — Clean and disinfect all “high-touch” surfaces every day. High touch surfaces include counters, tabletops, doorknobs, light switches, remote controls, bathroom fixtures, toilets, phones, keyboards, tablets, and bedside tables. Also, clean any surfaces that may have blood, stool, or body fluids on them.    ------------------------------------------    REMEMBER - a negative COVID test means you were negative AT THE TIME OF TESTING, and it is possible to have contracted COVID after being tested.    Headache    A headache is pain or discomfort felt around the head or neck area. The specific cause of a headache may not be found as there are many types including tension headaches, migraine headaches, and cluster headaches. Watch your condition for any changes. Things you can do to manage your pain include taking over the counter and prescription medications as instructed by your health care provider, lying down in a dark quiet room, limiting stress, getting regular sleep, and refraining from alcohol and tobacco products.    SEEK IMMEDIATE MEDICAL CARE IF YOU HAVE ANY OF THE FOLLOWING SYMPTOMS: fever, vomiting, stiff neck, loss of vision, problems with speech, muscle weakness, loss of balance, trouble walking, passing out, or confusion.    You can use 500-1000mg Tylenol every 6 hours for pain - as needed.  This is an over-the-counter medications - please respect the warnings on the label. This medication come with certain risks and side effects that you need to discuss with your doctor, especially if you are taking it for a prolonged period.    You can use 400-600mg Ibuprofen (such as motrin or advil) every 6 to 8 hours as needed for pain control.  Take ibuprofen with food or milk to lessen stomach upset.  This is an over-the-counter medication please respect the warnings on the label. All medications come with certain risks and side effects that you need to discuss with your doctor, especially if you are taking them for a prolonged period.

## 2021-05-16 PROBLEM — I10 ESSENTIAL (PRIMARY) HYPERTENSION: Chronic | Status: ACTIVE | Noted: 2017-03-07

## 2021-05-16 PROBLEM — I25.10 ATHEROSCLEROTIC HEART DISEASE OF NATIVE CORONARY ARTERY WITHOUT ANGINA PECTORIS: Chronic | Status: ACTIVE | Noted: 2017-03-07

## 2021-05-16 PROBLEM — E78.5 HYPERLIPIDEMIA, UNSPECIFIED: Chronic | Status: ACTIVE | Noted: 2017-03-07

## 2023-01-25 PROBLEM — I21.9 ACUTE MYOCARDIAL INFARCTION, UNSPECIFIED: Chronic | Status: ACTIVE | Noted: 2021-05-14

## 2023-01-27 ENCOUNTER — OUTPATIENT (OUTPATIENT)
Dept: OUTPATIENT SERVICES | Facility: HOSPITAL | Age: 58
LOS: 1 days | End: 2023-01-27
Payer: MEDICARE

## 2023-01-27 DIAGNOSIS — Z11.52 ENCOUNTER FOR SCREENING FOR COVID-19: ICD-10-CM

## 2023-01-27 DIAGNOSIS — I25.10 ATHEROSCLEROTIC HEART DISEASE OF NATIVE CORONARY ARTERY WITHOUT ANGINA PECTORIS: Chronic | ICD-10-CM

## 2023-01-27 DIAGNOSIS — Z95.5 PRESENCE OF CORONARY ANGIOPLASTY IMPLANT AND GRAFT: Chronic | ICD-10-CM

## 2023-01-27 LAB — SARS-COV-2 RNA SPEC QL NAA+PROBE: SIGNIFICANT CHANGE UP

## 2023-01-27 PROCEDURE — U0005: CPT

## 2023-01-27 PROCEDURE — U0003: CPT

## 2023-01-27 PROCEDURE — C9803: CPT

## 2023-01-30 ENCOUNTER — OUTPATIENT (OUTPATIENT)
Dept: OUTPATIENT SERVICES | Facility: HOSPITAL | Age: 58
LOS: 1 days | End: 2023-01-30
Payer: MEDICARE

## 2023-01-30 ENCOUNTER — TRANSCRIPTION ENCOUNTER (OUTPATIENT)
Age: 58
End: 2023-01-30

## 2023-01-30 VITALS
RESPIRATION RATE: 18 BRPM | HEART RATE: 66 BPM | SYSTOLIC BLOOD PRESSURE: 117 MMHG | HEIGHT: 62 IN | TEMPERATURE: 98 F | WEIGHT: 169.98 LBS | DIASTOLIC BLOOD PRESSURE: 69 MMHG | OXYGEN SATURATION: 98 %

## 2023-01-30 VITALS
SYSTOLIC BLOOD PRESSURE: 119 MMHG | HEART RATE: 70 BPM | RESPIRATION RATE: 18 BRPM | DIASTOLIC BLOOD PRESSURE: 78 MMHG | OXYGEN SATURATION: 95 %

## 2023-01-30 DIAGNOSIS — I25.10 ATHEROSCLEROTIC HEART DISEASE OF NATIVE CORONARY ARTERY WITHOUT ANGINA PECTORIS: Chronic | ICD-10-CM

## 2023-01-30 DIAGNOSIS — Z95.5 PRESENCE OF CORONARY ANGIOPLASTY IMPLANT AND GRAFT: Chronic | ICD-10-CM

## 2023-01-30 DIAGNOSIS — R94.39 ABNORMAL RESULT OF OTHER CARDIOVASCULAR FUNCTION STUDY: ICD-10-CM

## 2023-01-30 LAB
ANION GAP SERPL CALC-SCNC: 9 MMOL/L — SIGNIFICANT CHANGE UP (ref 5–17)
BUN SERPL-MCNC: 17 MG/DL — SIGNIFICANT CHANGE UP (ref 7–23)
CALCIUM SERPL-MCNC: 9.6 MG/DL — SIGNIFICANT CHANGE UP (ref 8.4–10.5)
CHLORIDE SERPL-SCNC: 107 MMOL/L — SIGNIFICANT CHANGE UP (ref 96–108)
CO2 SERPL-SCNC: 24 MMOL/L — SIGNIFICANT CHANGE UP (ref 22–31)
CREAT SERPL-MCNC: 0.78 MG/DL — SIGNIFICANT CHANGE UP (ref 0.5–1.3)
EGFR: 89 ML/MIN/1.73M2 — SIGNIFICANT CHANGE UP
GLUCOSE SERPL-MCNC: 92 MG/DL — SIGNIFICANT CHANGE UP (ref 70–99)
HCT VFR BLD CALC: 39.3 % — SIGNIFICANT CHANGE UP (ref 34.5–45)
HGB BLD-MCNC: 12.5 G/DL — SIGNIFICANT CHANGE UP (ref 11.5–15.5)
MCHC RBC-ENTMCNC: 30.4 PG — SIGNIFICANT CHANGE UP (ref 27–34)
MCHC RBC-ENTMCNC: 31.8 GM/DL — LOW (ref 32–36)
MCV RBC AUTO: 95.6 FL — SIGNIFICANT CHANGE UP (ref 80–100)
NRBC # BLD: 0 /100 WBCS — SIGNIFICANT CHANGE UP (ref 0–0)
PLATELET # BLD AUTO: 261 K/UL — SIGNIFICANT CHANGE UP (ref 150–400)
POTASSIUM SERPL-MCNC: 4.3 MMOL/L — SIGNIFICANT CHANGE UP (ref 3.5–5.3)
POTASSIUM SERPL-SCNC: 4.3 MMOL/L — SIGNIFICANT CHANGE UP (ref 3.5–5.3)
RBC # BLD: 4.11 M/UL — SIGNIFICANT CHANGE UP (ref 3.8–5.2)
RBC # FLD: 12.8 % — SIGNIFICANT CHANGE UP (ref 10.3–14.5)
SODIUM SERPL-SCNC: 140 MMOL/L — SIGNIFICANT CHANGE UP (ref 135–145)
WBC # BLD: 6.86 K/UL — SIGNIFICANT CHANGE UP (ref 3.8–10.5)
WBC # FLD AUTO: 6.86 K/UL — SIGNIFICANT CHANGE UP (ref 3.8–10.5)

## 2023-01-30 PROCEDURE — C1887: CPT

## 2023-01-30 PROCEDURE — 80048 BASIC METABOLIC PNL TOTAL CA: CPT

## 2023-01-30 PROCEDURE — 93458 L HRT ARTERY/VENTRICLE ANGIO: CPT

## 2023-01-30 PROCEDURE — 85027 COMPLETE CBC AUTOMATED: CPT

## 2023-01-30 PROCEDURE — C1894: CPT

## 2023-01-30 PROCEDURE — C1769: CPT

## 2023-01-30 RX ORDER — ATORVASTATIN CALCIUM 80 MG/1
1 TABLET, FILM COATED ORAL
Qty: 0 | Refills: 0 | DISCHARGE

## 2023-01-30 RX ORDER — FENOFIBRATE,MICRONIZED 130 MG
1 CAPSULE ORAL
Qty: 0 | Refills: 0 | DISCHARGE

## 2023-01-30 RX ORDER — SPIRONOLACTONE 25 MG/1
1 TABLET, FILM COATED ORAL
Qty: 0 | Refills: 0 | DISCHARGE

## 2023-01-30 RX ORDER — ASPIRIN/CALCIUM CARB/MAGNESIUM 324 MG
1 TABLET ORAL
Qty: 0 | Refills: 0 | DISCHARGE

## 2023-01-30 RX ORDER — SACUBITRIL AND VALSARTAN 24; 26 MG/1; MG/1
1 TABLET, FILM COATED ORAL
Qty: 0 | Refills: 0 | DISCHARGE

## 2023-01-30 RX ORDER — ROSUVASTATIN CALCIUM 5 MG/1
1 TABLET ORAL
Qty: 0 | Refills: 0 | DISCHARGE

## 2023-01-30 RX ORDER — EZETIMIBE 10 MG/1
1 TABLET ORAL
Qty: 0 | Refills: 0 | DISCHARGE

## 2023-01-30 NOTE — H&P CARDIOLOGY - NSICDXPASTMEDICALHX_GEN_ALL_CORE_FT
PAST MEDICAL HISTORY:  (HFpEF) heart failure with preserved ejection fraction     CAD (coronary artery disease)     Cardiac arrest 2013 MI stents and cardiac arrested    HLD (hyperlipidemia)     HTN (hypertension)     MI (myocardial infarction)

## 2023-01-30 NOTE — H&P CARDIOLOGY - CARDIOVASCULAR DETAILS
Called patient relayed below message from provider.    Azeb Mac RN  [facility]      
It appears on review of chart patient recently had spine surgery. Pain medication refills should go through their office.   Brigid Jimenez PA-C    
oxyCODONE-acetaminophen (PERCOCET) 5-325 MG per tablet      Last Written Prescription Date:  10/26/17  Last Fill Quantity: 30,   # refills: 0  Last Office Visit: 10/31/17  Future Office visit:       Routing refill request to provider for review/approval because:  Drug not on the [facility], [facility] or [facility] refill protocol or controlled substance    
positive S1/positive S2

## 2023-01-30 NOTE — H&P CARDIOLOGY - NSICDXPASTSURGICALHX_GEN_ALL_CORE_FT
PAST SURGICAL HISTORY:  CAD S/P percutaneous coronary angioplasty     No significant past surgical history     Stented coronary artery x2, last stent in 1/2014

## 2023-01-30 NOTE — ASU DISCHARGE PLAN (ADULT/PEDIATRIC) - NS MD DC FALL RISK RISK
For information on Fall & Injury Prevention, visit: [email or web address] OR  [email or web address] OR  https://www.cdc.gov/steadi/patient.html

## 2023-01-30 NOTE — ASU DISCHARGE PLAN (ADULT/PEDIATRIC) - ASU DC SPECIAL INSTRUCTIONSFT
Wound Care: The day AFTER your procedure:  Remove bandage GENTLY, and clean using mild soap and gentle warm, water stream, pat dry.   Leave OPEN to air. YOU MAY SHOWER  DO NOT SOAK your procedure site for 1 week (no baths, no pools, no tubs)  Check your wrist or groin puncture site everyday.  A small amount of soreness and bruising is normal  ACTIVITY for the next 24 hours:  1) DO NOT DRIVE  2) DO NOT make any important decisions or sign legal documents  3) DO NOT operate heavy InnoCentiveary   You may resume sexual activity in 48 hours, unless otherwise instructed by your cardiologist  If your procedure was done through the WRIST, for the NEXT 3 DAYS:  AVOID pushing pulling  or repeated movement of that hand and wrist (eg: typing, hammering)  DO NOT LIFT anything more than 5 lbs     If your procedure was done through the GROIN, for the NEXT 5 DAYS:  Limit climbing the stairs, no strenuous activities, no pushing, no pulling, no straining  Do not lift anything that is 10lbs or heavier   MEDICATION:  Take your medications as explained (see discharge paperwork). If you received a stent, you will be taking medication to KEEP YOUR STENT OPEN. DO NOT STOP THESE MEDICATIONS UNLESS DIRECTED BY A CARDIOLOGIST  If you smoke, WE RECOMMEND YOU QUIT (you may call [phone] the Center of Tobacco Control if you need assistance)   FOLLOW UP: Please follow up with your private cardiologist (insert name) in 2 weeks.  Please call immediately for an appointment upon discharge from the hospital.    ***CALL YOUR DOCTOR***   If you experience: chest pain, fever, chills, body aches, or severe pain, swelling, redness, heat or yellow discharge at incision site or if you experience bleeding, temperature change, numbness or excruciating pain at the procedural site  If you are unable to reach your doctor, you may contact:    Cardiology Office at [facility] at [phone]   Cardiac Short Stay Unit (CSSU) [phone]    Cardiac Recovery Suite (CRS) [phone]

## 2023-01-30 NOTE — H&P CARDIOLOGY - HISTORY OF PRESENT ILLNESS
57 year old female with PMHx of CAD s/p PCI left main to LAD x1 in January 2014 and PCI x1 to circumflex in 2013, MI, HTN, HLD, chronic combined systolic and diastolic HFpEF, presented to her cardiologist office Dr. Tex Johnston for a routine visit and had an abnormal stress test showing medium defect during stress of moderate intensity, fixed perfusion abnormality located in the apical septal and apex segments, a mostly reversible perfusion abnormality located in the mid anterior and apical anterior segments. Findings are consistent with infarction with meme-infarct ischemia of the LAD. Last cath at [facility] in 2017 showed no new blockages. Recent echo with EF 55%, mild MR and TR. Patient now presents to [facility] for LHC w/ Dr. Brown. Currently denies chest pain, SOB, palpitations, dizziness.    Cards: Tex Johnston

## 2023-09-18 PROBLEM — I50.30 UNSPECIFIED DIASTOLIC (CONGESTIVE) HEART FAILURE: Chronic | Status: ACTIVE | Noted: 2023-01-30

## 2023-10-24 ENCOUNTER — APPOINTMENT (OUTPATIENT)
Dept: PULMONOLOGY | Facility: CLINIC | Age: 58
End: 2023-10-24
Payer: MEDICARE

## 2023-10-24 VITALS
OXYGEN SATURATION: 94 % | HEIGHT: 61 IN | SYSTOLIC BLOOD PRESSURE: 116 MMHG | WEIGHT: 172 LBS | BODY MASS INDEX: 32.47 KG/M2 | DIASTOLIC BLOOD PRESSURE: 75 MMHG | HEART RATE: 65 BPM

## 2023-10-24 DIAGNOSIS — J92.9 PLEURAL PLAQUE W/OUT ASBESTOS: ICD-10-CM

## 2023-10-24 DIAGNOSIS — R93.89 ABNORMAL FINDINGS ON DIAGNOSTIC IMAGING OF OTHER SPECIFIED BODY STRUCTURES: ICD-10-CM

## 2023-10-24 PROCEDURE — 94726 PLETHYSMOGRAPHY LUNG VOLUMES: CPT

## 2023-10-24 PROCEDURE — 99204 OFFICE O/P NEW MOD 45 MIN: CPT | Mod: 25

## 2023-10-24 PROCEDURE — 94060 EVALUATION OF WHEEZING: CPT

## 2023-10-24 PROCEDURE — ZZZZZ: CPT

## 2023-10-24 PROCEDURE — 94729 DIFFUSING CAPACITY: CPT

## 2023-11-02 ENCOUNTER — APPOINTMENT (OUTPATIENT)
Dept: CT IMAGING | Facility: IMAGING CENTER | Age: 58
End: 2023-11-02
Payer: MEDICARE

## 2023-11-02 ENCOUNTER — OUTPATIENT (OUTPATIENT)
Dept: OUTPATIENT SERVICES | Facility: HOSPITAL | Age: 58
LOS: 1 days | End: 2023-11-02
Payer: MEDICARE

## 2023-11-02 DIAGNOSIS — Z95.5 PRESENCE OF CORONARY ANGIOPLASTY IMPLANT AND GRAFT: Chronic | ICD-10-CM

## 2023-11-02 DIAGNOSIS — R93.89 ABNORMAL FINDINGS ON DIAGNOSTIC IMAGING OF OTHER SPECIFIED BODY STRUCTURES: ICD-10-CM

## 2023-11-02 DIAGNOSIS — J92.9 PLEURAL PLAQUE WITHOUT ASBESTOS: ICD-10-CM

## 2023-11-02 DIAGNOSIS — I25.10 ATHEROSCLEROTIC HEART DISEASE OF NATIVE CORONARY ARTERY WITHOUT ANGINA PECTORIS: Chronic | ICD-10-CM

## 2023-11-02 PROCEDURE — 71250 CT THORAX DX C-: CPT

## 2023-11-02 PROCEDURE — 71250 CT THORAX DX C-: CPT | Mod: 26

## 2023-11-07 ENCOUNTER — TRANSCRIPTION ENCOUNTER (OUTPATIENT)
Age: 58
End: 2023-11-07

## 2023-11-14 ENCOUNTER — NON-APPOINTMENT (OUTPATIENT)
Age: 58
End: 2023-11-14

## 2024-02-08 NOTE — PATIENT PROFILE ADULT. - TEACHING/LEARNING FACTORS INFLUENCE READINESS TO LEARN
Plan: Stressed applying medications to entire face, not just affected areas; also apply moisturizer after application to decrease irritation pt notes intermittently Detail Level: Zone Continue Regimen: Hydroquinone Emulsion - Apply to face QHS 3 months on, 1 month off\\nTretinoin 0.025% - Apply to face QHS during month break from hydroquinone emulsion\\nSunscreen daily none

## 2024-05-02 NOTE — ED ADULT NURSE NOTE - BREATH SOUNDS, LEFT
PA Initiation    Medication: SYNTHROID 175 MCG PO TABS  Insurance Company: [employer]-Specialty PA's - Phone [phone] Fax [phone]  Pharmacy Filling the Rx: [id number] - [place], [place] - [address] AT [facility] [address]  Filling Pharmacy Phone: [phone]  Filling Pharmacy Fax:    Start Date: 5/2/2024     clear

## 2024-07-21 ENCOUNTER — NON-APPOINTMENT (OUTPATIENT)
Age: 59
End: 2024-07-21

## 2024-07-22 ENCOUNTER — APPOINTMENT (OUTPATIENT)
Dept: DERMATOLOGY | Facility: CLINIC | Age: 59
End: 2024-07-22
Payer: MEDICARE

## 2024-07-22 DIAGNOSIS — L82.1 OTHER SEBORRHEIC KERATOSIS: ICD-10-CM

## 2024-07-22 DIAGNOSIS — L91.8 OTHER HYPERTROPHIC DISORDERS OF THE SKIN: ICD-10-CM

## 2024-07-22 DIAGNOSIS — D48.9 NEOPLASM OF UNCERTAIN BEHAVIOR, UNSPECIFIED: ICD-10-CM

## 2024-07-22 PROCEDURE — 99203 OFFICE O/P NEW LOW 30 MIN: CPT | Mod: 25

## 2024-07-22 PROCEDURE — 11102 TANGNTL BX SKIN SINGLE LES: CPT

## 2024-07-23 NOTE — PHYSICAL EXAM
[Alert] : alert [Oriented x 3] : ~L oriented x 3 [Declined] : declined [FreeTextEntry3] : - Several 1-3 mm skin colored to brown pedunculated fleshy papules over neck - Scattered 5-8 mm flat round skin colored waxy stuck on papules on dorsal feet - 1 mm irregular shaped with light brown and dark brown macule on R lateral foot

## 2024-07-23 NOTE — HISTORY OF PRESENT ILLNESS
[FreeTextEntry1] : NP: bumps on neck and feet [de-identified] : WES PEREYRA is a 59 year old who is presenting for evaluation of:  #Spots on neck; Present for several years. Asx.  #Spots on feet; Present for a year, asx.  Skin cancer hx: none Family hx of skin cancer: none

## 2024-07-23 NOTE — HISTORY OF PRESENT ILLNESS
[FreeTextEntry1] : NP: bumps on neck and feet [de-identified] : WES PEREYRA is a 59 year old who is presenting for evaluation of:  #Spots on neck; Present for several years. Asx.  #Spots on feet; Present for a year, asx.  Skin cancer hx: none Family hx of skin cancer: none

## 2024-07-23 NOTE — ASSESSMENT
[FreeTextEntry1] : #Neoplasm of uncertain behavior x1, R lateral foot  - Rule out nevus vs MM - Recommend tangential shave biopsy for definitive diagnosis. - After informed consent was obtained, using Hibiclens for cleansing and 1% Lidocaine with epinephrine for anesthetic, with sterile technique a shave biopsy was used to obtain a biopsy specimen of the lesion. Hemostasis was obtained with aluminum chloride. Vaseline was applied, and wound care instructions provided. The specimen was labeled and sent to pathology for evaluation. The procedure was well tolerated without complication. - The patient will be contacted with biopsy results  #Seborrheic Keratosis - These growths are benign - Related to genetics - these lesions run in families; NOT related to sun exposure - No treatment warranted unless inflamed; can use OTC Sarna lotion PRN itch  #Acrochordon/Skin tag, L neck - Benign diagnosis - No treatment necessary unless bothersome to patient  RTC pending bx results

## 2024-08-01 ENCOUNTER — RESULT REVIEW (OUTPATIENT)
Age: 59
End: 2024-08-01

## 2024-08-14 ENCOUNTER — NON-APPOINTMENT (OUTPATIENT)
Age: 59
End: 2024-08-14

## 2024-09-04 ENCOUNTER — OUTPATIENT (OUTPATIENT)
Dept: OUTPATIENT SERVICES | Facility: HOSPITAL | Age: 59
LOS: 1 days | End: 2024-09-04

## 2024-09-04 VITALS
HEART RATE: 70 BPM | WEIGHT: 175.05 LBS | DIASTOLIC BLOOD PRESSURE: 88 MMHG | TEMPERATURE: 98 F | SYSTOLIC BLOOD PRESSURE: 127 MMHG | OXYGEN SATURATION: 97 % | RESPIRATION RATE: 18 BRPM | HEIGHT: 60 IN

## 2024-09-04 DIAGNOSIS — Z95.5 PRESENCE OF CORONARY ANGIOPLASTY IMPLANT AND GRAFT: Chronic | ICD-10-CM

## 2024-09-04 DIAGNOSIS — N84.0 POLYP OF CORPUS UTERI: ICD-10-CM

## 2024-09-04 DIAGNOSIS — Z95.5 PRESENCE OF CORONARY ANGIOPLASTY IMPLANT AND GRAFT: ICD-10-CM

## 2024-09-04 DIAGNOSIS — I25.10 ATHEROSCLEROTIC HEART DISEASE OF NATIVE CORONARY ARTERY WITHOUT ANGINA PECTORIS: Chronic | ICD-10-CM

## 2024-09-04 DIAGNOSIS — Z98.890 OTHER SPECIFIED POSTPROCEDURAL STATES: Chronic | ICD-10-CM

## 2024-09-04 RX ORDER — SODIUM CHLORIDE IRRIG SOLUTION 0.9 %
1000 SOLUTION, IRRIGATION IRRIGATION
Refills: 0 | Status: DISCONTINUED | OUTPATIENT
Start: 2024-09-23 | End: 2024-10-07

## 2024-09-04 NOTE — H&P PST ADULT - ATTENDING COMMENTS
OBGYN Attending Note    H&P reviewed.  Agree with above.  Shortly before surgery, I met with this patient and discussed the planned procedure, including exam under anesthesia by myself and learners (resident/medical student).  Risks of the procedure were reviewed, including but not limited to bleeding, infection, damage to surrounding organs.  Post-op care and follow up were reviewed.  All questions were answered.      VALENTINO Abad MD

## 2024-09-04 NOTE — H&P PST ADULT - MUSCULOSKELETAL
negative ROM intact/normal gait ROM intact/no joint swelling/normal gait/strength 5/5 bilateral upper extremities/strength 5/5 bilateral lower extremities

## 2024-09-04 NOTE — H&P PST ADULT - PROBLEM SELECTOR PLAN 1
Schedule for Dilation Curettage Hysteroscopy, polypectomy using Myosure tentatively on 09/23/24. Pre op instructions, famotidine given and explained. Pt verbalized understanding. Schedule for Dilation Curettage Hysteroscopy, polypectomy using Myosure tentatively on 09/23/24. Pre op instructions, famotidine given and explained. Pt verbalized understanding.    LAB. results done at PCP's office on 07/2024

## 2024-09-04 NOTE — H&P PST ADULT - GASTROINTESTINAL
negative soft/nontender/nondistended/normal active bowel sounds soft/nontender/nondistended/normal active bowel sounds/no palpable duarte

## 2024-09-04 NOTE — H&P PST ADULT - PROBLEM SELECTOR PLAN 3
POST OP DELIRIUM SCREENING   1. Patient eligible for dante risk screen age>75? - No  2. Health care proxy paperwork given to patient? Yes - Paperwork given and explained   3. Impaired mobility (ie: uses cane, walker, wheelchair, or assist device)?   4. Known dementia diagnosis?   5. Impaired functional status (METS<4)?   6. Malnutrition BMI<20?  Email sent to surgeon if risk factor identified

## 2024-09-04 NOTE — H&P PST ADULT - NSICDXPASTSURGICALHX_GEN_ALL_CORE_FT
PAST SURGICAL HISTORY:  CAD S/P percutaneous coronary angioplasty     Stented coronary artery x2, last stent in 1/2014     PAST SURGICAL HISTORY:  CAD S/P percutaneous coronary angioplasty     S/P biopsy of cervix     Stented coronary artery x2, last stent in 1/2014

## 2024-09-04 NOTE — H&P PST ADULT - LAST CARDIAC ANGIOGRAM/IMAGING
2023 at [facility] 2023 at [facility] no stent placed at that time (stents x2 placed in 2013 & 2014)

## 2024-09-04 NOTE — H&P PST ADULT - RESPIRATORY
clear to auscultation bilaterally/no wheezes/no rales/no rhonchi/no respiratory distress/airway patent/breath sounds equal/good air movement/no intercostal retractions

## 2024-09-04 NOTE — H&P PST ADULT - NEGATIVE ENMT SYMPTOMS
no ear pain/no tinnitus/no vertigo/no nasal congestion/no nasal discharge/no nose bleeds/no abnormal taste sensation/no throat pain/no dysphagia

## 2024-09-04 NOTE — H&P PST ADULT - HISTORY OF PRESENT ILLNESS
59 year old female with PMHx of CAD s/p PCI left main to LAD x1 in January 2014 and PCI x1 to circumflex in 2013, MI, HTN, HLD,     microscopic hematuria on routine PCP visit 07/2024. Referred  to GYN - S/P pap smear. TVUS. S/P biopsy on 08/2024 59 year old female with PMHx of CAD s/p PCI left main to LAD x1 in January 2014 and PCI x1 to circumflex in 2013, MI, HLD, presents for pre op evaluation stated that on routine PCP visit on 07/2024 - lab results showed microscopic hematuria. Pt was referred to GYN, S/P TVUS and biopsy of cervix on 08.2024. Pre op diagnosis: polyp of corpus uteri. Schedule for Dilation Curettage Hysteroscopy, polypectomy using Myosure tentatively on 09/23/24.

## 2024-09-13 ENCOUNTER — APPOINTMENT (OUTPATIENT)
Dept: DERMATOLOGY | Facility: CLINIC | Age: 59
End: 2024-09-13

## 2024-09-20 NOTE — ASU PATIENT PROFILE, ADULT - FALL HARM RISK - UNIVERSAL INTERVENTIONS
Bed in lowest position, wheels locked, appropriate side rails in place/Call bell, personal items and telephone in reach/Instruct patient to call for assistance before getting out of bed or chair/Non-slip footwear when patient is out of bed/[place] to call system/Physically safe environment - no spills, clutter or unnecessary equipment/Purposeful Proactive Rounding/Room/bathroom lighting operational, light cord in reach

## 2024-09-22 ENCOUNTER — TRANSCRIPTION ENCOUNTER (OUTPATIENT)
Age: 59
End: 2024-09-22

## 2024-09-23 ENCOUNTER — OUTPATIENT (OUTPATIENT)
Dept: OUTPATIENT SERVICES | Facility: HOSPITAL | Age: 59
LOS: 1 days | Discharge: HOME | End: 2024-09-23
Payer: MEDICARE

## 2024-09-23 ENCOUNTER — RESULT REVIEW (OUTPATIENT)
Age: 59
End: 2024-09-23

## 2024-09-23 ENCOUNTER — TRANSCRIPTION ENCOUNTER (OUTPATIENT)
Age: 59
End: 2024-09-23

## 2024-09-23 VITALS
TEMPERATURE: 99 F | RESPIRATION RATE: 16 BRPM | OXYGEN SATURATION: 100 % | HEIGHT: 60 IN | HEART RATE: 58 BPM | WEIGHT: 175.05 LBS | SYSTOLIC BLOOD PRESSURE: 130 MMHG | DIASTOLIC BLOOD PRESSURE: 70 MMHG

## 2024-09-23 VITALS
HEART RATE: 67 BPM | RESPIRATION RATE: 15 BRPM | DIASTOLIC BLOOD PRESSURE: 81 MMHG | SYSTOLIC BLOOD PRESSURE: 116 MMHG | OXYGEN SATURATION: 95 %

## 2024-09-23 DIAGNOSIS — N84.0 POLYP OF CORPUS UTERI: ICD-10-CM

## 2024-09-23 DIAGNOSIS — Z98.890 OTHER SPECIFIED POSTPROCEDURAL STATES: Chronic | ICD-10-CM

## 2024-09-23 DIAGNOSIS — I25.10 ATHEROSCLEROTIC HEART DISEASE OF NATIVE CORONARY ARTERY WITHOUT ANGINA PECTORIS: Chronic | ICD-10-CM

## 2024-09-23 DIAGNOSIS — Z95.5 PRESENCE OF CORONARY ANGIOPLASTY IMPLANT AND GRAFT: Chronic | ICD-10-CM

## 2024-09-23 PROCEDURE — 88305 TISSUE EXAM BY PATHOLOGIST: CPT | Mod: 26

## 2024-09-23 DEVICE — MYOSURE TISSUE REMOVAL DEVICE LITE: Type: IMPLANTABLE DEVICE | Status: FUNCTIONAL

## 2024-09-23 RX ORDER — SODIUM CHLORIDE IRRIG SOLUTION 0.9 %
1000 SOLUTION, IRRIGATION IRRIGATION
Refills: 0 | Status: DISCONTINUED | OUTPATIENT
Start: 2024-09-23 | End: 2024-10-07

## 2024-09-23 RX ORDER — ACETAMINOPHEN 325 MG
3 TABLET ORAL
Qty: 0 | Refills: 0 | DISCHARGE

## 2024-09-23 RX ORDER — CARVEDILOL 6.25 MG/1
1 TABLET ORAL
Refills: 0 | DISCHARGE

## 2024-09-23 RX ORDER — SPIRONOLACTONE 25 MG/1
1 TABLET, FILM COATED ORAL
Refills: 0 | DISCHARGE

## 2024-09-23 NOTE — BRIEF OPERATIVE NOTE - OPERATION/FINDINGS
Exam under anesthesia revealed normal appearing vagina and cervix. Entry into uterus was difficulty requiring use of an os finder. Hysteroscopy showed: normal cervical tissue, atrophic uterus and a 1-2 cm endometrial polyp. Bilateral ostia were not visualized.

## 2024-09-23 NOTE — ASU DISCHARGE PLAN (ADULT/PEDIATRIC) - CARE PROVIDER_API CALL
Sole Abad  Obstetrics and Gynecology  [address], [address]  [place] [address]  Phone: [phone]  Fax: [phone]  Follow Up Time: 2 weeks

## 2024-09-23 NOTE — ASU DISCHARGE PLAN (ADULT/PEDIATRIC) - NURSING INSTRUCTIONS
ALTERNATE TYLENOL AND MOTRIN EVERY 3 HOURS MAKING SURE THAT EACH DOSE OF TYLENOL IS 6 HRS FROM PREVIOUS TYLENOL DOSE AND EACH DOSE OF MOTRIN IS 6 HOURS FROM PREVIOUS MOTRIN DOSE.  THE FIRST DOSE OF MOTRIN/IBUPROFEN CAN BE WHEN NEEDED AND FIRST DOSE OF TYLENOL/ACETAMINOPHEN INCLUDING PERCOCET/VICODIN AND COLD MEDICINE CAN BE NO EARLIER THAN _8:30 PM__. THE MAXIMIUM AMOUNT OF DAILY TYLENOL IS 4000MG. PLEASE KEEP THAT IN MIND.

## 2024-09-23 NOTE — BRIEF OPERATIVE NOTE - NSICDXBRIEFPROCEDURE_GEN_ALL_CORE_FT
PROCEDURES:  Hysteroscopy with polypectomy and dilation of cervix with curettage procedure 23-Sep-2024 15:55:43  Chantelle Jordan

## 2024-09-27 LAB — SURGICAL PATHOLOGY STUDY: SIGNIFICANT CHANGE UP

## 2024-10-28 ENCOUNTER — APPOINTMENT (OUTPATIENT)
Dept: DERMATOLOGY | Facility: CLINIC | Age: 59
End: 2024-10-28
Payer: MEDICARE

## 2024-10-28 DIAGNOSIS — D22.71 MELANOCYTIC NEVI OF RIGHT LOWER LIMB, INCLUDING HIP: ICD-10-CM

## 2024-10-28 PROCEDURE — 11422 EXC H-F-NK-SP B9+MARG 1.1-2: CPT

## 2024-10-31 ENCOUNTER — NON-APPOINTMENT (OUTPATIENT)
Age: 59
End: 2024-10-31

## 2024-10-31 LAB — DERMATOLOGY BIOPSY: NORMAL

## 2024-12-06 ENCOUNTER — NON-APPOINTMENT (OUTPATIENT)
Age: 59
End: 2024-12-06

## 2025-01-06 ENCOUNTER — APPOINTMENT (OUTPATIENT)
Dept: DERMATOLOGY | Facility: CLINIC | Age: 60
End: 2025-01-06

## 2025-04-29 NOTE — H&P PST ADULT - SUPRACLAVICULAR R
Antihistamine Recommendations: Zyrtec (cetirizine), Allegra (fexofenadine) Cleanser Recommendations: Dove, Aveeno, Cetaphil, CeraVe, Radha Cream Moisturizer Recommendations: Eucerin, Aveeno, Cetaphil, CeraVe, Radha Cream\\nCan use OTC itch creams: CeraVe anti-itch, Curel Itch Defense, Seth Detail Level: Zone Patient Specific Counseling (Will Not Stick From Patient To Patient): \\n- Rash likely has allergic component. States she feels like pins and needles on skin and in scalp.  normal

## (undated) DEVICE — PACK D&C

## (undated) DEVICE — PREP TRAY DRY SKIN PREP SCRUB

## (undated) DEVICE — FLUENT FMS PROCEDURE KIT

## (undated) DEVICE — PREP DYNA-HEX CHG 4% 4OZ BOTTLE (BACTOSHIELD)

## (undated) DEVICE — MYOSURE SCOPE SEAL